# Patient Record
Sex: MALE | Race: WHITE | NOT HISPANIC OR LATINO | URBAN - METROPOLITAN AREA
[De-identification: names, ages, dates, MRNs, and addresses within clinical notes are randomized per-mention and may not be internally consistent; named-entity substitution may affect disease eponyms.]

---

## 2018-05-19 ENCOUNTER — INPATIENT (INPATIENT)
Facility: HOSPITAL | Age: 33
LOS: 0 days | Discharge: HOME | DRG: 343 | End: 2018-05-20
Attending: SURGERY | Admitting: SURGERY
Payer: COMMERCIAL

## 2018-05-19 VITALS
RESPIRATION RATE: 20 BRPM | DIASTOLIC BLOOD PRESSURE: 79 MMHG | OXYGEN SATURATION: 100 % | HEART RATE: 61 BPM | TEMPERATURE: 98 F | WEIGHT: 149.91 LBS | SYSTOLIC BLOOD PRESSURE: 111 MMHG | HEIGHT: 67 IN

## 2018-05-19 LAB
ALBUMIN SERPL ELPH-MCNC: 4.8 G/DL — SIGNIFICANT CHANGE UP (ref 3.3–5)
ALP SERPL-CCNC: 37 U/L — LOW (ref 40–120)
ALT FLD-CCNC: 22 U/L — SIGNIFICANT CHANGE UP (ref 10–45)
ANION GAP SERPL CALC-SCNC: 17 MMOL/L — SIGNIFICANT CHANGE UP (ref 5–17)
APPEARANCE UR: CLEAR — SIGNIFICANT CHANGE UP
AST SERPL-CCNC: 29 U/L — SIGNIFICANT CHANGE UP (ref 10–40)
BASOPHILS NFR BLD AUTO: 0.1 % — SIGNIFICANT CHANGE UP (ref 0–2)
BILIRUB SERPL-MCNC: 0.8 MG/DL — SIGNIFICANT CHANGE UP (ref 0.2–1.2)
BILIRUB UR-MCNC: (no result)
BLD GP AB SCN SERPL QL: NEGATIVE — SIGNIFICANT CHANGE UP
BLD GP AB SCN SERPL QL: NEGATIVE — SIGNIFICANT CHANGE UP
BUN SERPL-MCNC: 14 MG/DL — SIGNIFICANT CHANGE UP (ref 7–23)
CALCIUM SERPL-MCNC: 9.6 MG/DL — SIGNIFICANT CHANGE UP (ref 8.4–10.5)
CHLORIDE SERPL-SCNC: 96 MMOL/L — SIGNIFICANT CHANGE UP (ref 96–108)
CO2 SERPL-SCNC: 25 MMOL/L — SIGNIFICANT CHANGE UP (ref 22–31)
COLOR SPEC: YELLOW — SIGNIFICANT CHANGE UP
CREAT SERPL-MCNC: 0.96 MG/DL — SIGNIFICANT CHANGE UP (ref 0.5–1.3)
DIFF PNL FLD: NEGATIVE — SIGNIFICANT CHANGE UP
EXTRA BLUE TOP TUBE: SIGNIFICANT CHANGE UP
GLUCOSE SERPL-MCNC: 118 MG/DL — HIGH (ref 70–99)
GLUCOSE UR QL: NEGATIVE — SIGNIFICANT CHANGE UP
HCT VFR BLD CALC: 43 % — SIGNIFICANT CHANGE UP (ref 39–50)
HGB BLD-MCNC: 15.3 G/DL — SIGNIFICANT CHANGE UP (ref 13–17)
KETONES UR-MCNC: >=80 MG/DL
LEUKOCYTE ESTERASE UR-ACNC: NEGATIVE — SIGNIFICANT CHANGE UP
LIDOCAIN IGE QN: 23 U/L — SIGNIFICANT CHANGE UP (ref 7–60)
LYMPHOCYTES # BLD AUTO: 4.5 % — LOW (ref 13–44)
MCHC RBC-ENTMCNC: 31.8 PG — SIGNIFICANT CHANGE UP (ref 27–34)
MCHC RBC-ENTMCNC: 35.6 G/DL — SIGNIFICANT CHANGE UP (ref 32–36)
MCV RBC AUTO: 89.4 FL — SIGNIFICANT CHANGE UP (ref 80–100)
MONOCYTES NFR BLD AUTO: 12.1 % — SIGNIFICANT CHANGE UP (ref 2–14)
NEUTROPHILS NFR BLD AUTO: 83.3 % — HIGH (ref 43–77)
NITRITE UR-MCNC: NEGATIVE — SIGNIFICANT CHANGE UP
PH UR: 6.5 — SIGNIFICANT CHANGE UP (ref 5–8)
PLATELET # BLD AUTO: 173 K/UL — SIGNIFICANT CHANGE UP (ref 150–400)
POTASSIUM SERPL-MCNC: 4.2 MMOL/L — SIGNIFICANT CHANGE UP (ref 3.5–5.3)
POTASSIUM SERPL-SCNC: 4.2 MMOL/L — SIGNIFICANT CHANGE UP (ref 3.5–5.3)
PROT SERPL-MCNC: 8.1 G/DL — SIGNIFICANT CHANGE UP (ref 6–8.3)
PROT UR-MCNC: (no result) MG/DL
RBC # BLD: 4.81 M/UL — SIGNIFICANT CHANGE UP (ref 4.2–5.8)
RBC # FLD: 12.3 % — SIGNIFICANT CHANGE UP (ref 10.3–16.9)
RH IG SCN BLD-IMP: NEGATIVE — SIGNIFICANT CHANGE UP
RH IG SCN BLD-IMP: NEGATIVE — SIGNIFICANT CHANGE UP
SODIUM SERPL-SCNC: 138 MMOL/L — SIGNIFICANT CHANGE UP (ref 135–145)
SP GR SPEC: 1.02 — SIGNIFICANT CHANGE UP (ref 1–1.03)
UROBILINOGEN FLD QL: 0.2 E.U./DL — SIGNIFICANT CHANGE UP
WBC # BLD: 16.2 K/UL — HIGH (ref 3.8–10.5)
WBC # FLD AUTO: 16.2 K/UL — HIGH (ref 3.8–10.5)

## 2018-05-19 PROCEDURE — 99285 EMERGENCY DEPT VISIT HI MDM: CPT | Mod: 25

## 2018-05-19 PROCEDURE — 93010 ELECTROCARDIOGRAM REPORT: CPT

## 2018-05-19 PROCEDURE — 74177 CT ABD & PELVIS W/CONTRAST: CPT | Mod: 26

## 2018-05-19 RX ORDER — SODIUM CHLORIDE 9 MG/ML
1000 INJECTION, SOLUTION INTRAVENOUS
Qty: 0 | Refills: 0 | Status: DISCONTINUED | OUTPATIENT
Start: 2018-05-19 | End: 2018-05-19

## 2018-05-19 RX ORDER — HEPARIN SODIUM 5000 [USP'U]/ML
5000 INJECTION INTRAVENOUS; SUBCUTANEOUS EVERY 8 HOURS
Qty: 0 | Refills: 0 | Status: DISCONTINUED | OUTPATIENT
Start: 2018-05-19 | End: 2018-05-19

## 2018-05-19 RX ORDER — CEFOXITIN 1 G/1
2 INJECTION, POWDER, FOR SOLUTION INTRAVENOUS ONCE
Qty: 0 | Refills: 0 | Status: COMPLETED | OUTPATIENT
Start: 2018-05-19 | End: 2018-05-19

## 2018-05-19 RX ORDER — SODIUM CHLORIDE 9 MG/ML
1000 INJECTION INTRAMUSCULAR; INTRAVENOUS; SUBCUTANEOUS ONCE
Qty: 0 | Refills: 0 | Status: COMPLETED | OUTPATIENT
Start: 2018-05-19 | End: 2018-05-19

## 2018-05-19 RX ORDER — IOHEXOL 300 MG/ML
50 INJECTION, SOLUTION INTRAVENOUS ONCE
Qty: 0 | Refills: 0 | Status: COMPLETED | OUTPATIENT
Start: 2018-05-19 | End: 2018-05-19

## 2018-05-19 RX ORDER — FAMOTIDINE 10 MG/ML
20 INJECTION INTRAVENOUS ONCE
Qty: 0 | Refills: 0 | Status: COMPLETED | OUTPATIENT
Start: 2018-05-19 | End: 2018-05-19

## 2018-05-19 RX ORDER — SODIUM CHLORIDE 9 MG/ML
3 INJECTION INTRAMUSCULAR; INTRAVENOUS; SUBCUTANEOUS ONCE
Qty: 0 | Refills: 0 | Status: COMPLETED | OUTPATIENT
Start: 2018-05-19 | End: 2018-05-19

## 2018-05-19 RX ORDER — HYDROMORPHONE HYDROCHLORIDE 2 MG/ML
0.5 INJECTION INTRAMUSCULAR; INTRAVENOUS; SUBCUTANEOUS EVERY 6 HOURS
Qty: 0 | Refills: 0 | Status: DISCONTINUED | OUTPATIENT
Start: 2018-05-19 | End: 2018-05-19

## 2018-05-19 RX ORDER — ONDANSETRON 8 MG/1
4 TABLET, FILM COATED ORAL EVERY 6 HOURS
Qty: 0 | Refills: 0 | Status: DISCONTINUED | OUTPATIENT
Start: 2018-05-19 | End: 2018-05-19

## 2018-05-19 RX ORDER — CEFOTETAN DISODIUM 1 G
2 VIAL (EA) INJECTION EVERY 12 HOURS
Qty: 0 | Refills: 0 | Status: DISCONTINUED | OUTPATIENT
Start: 2018-05-19 | End: 2018-05-19

## 2018-05-19 RX ORDER — ONDANSETRON 8 MG/1
4 TABLET, FILM COATED ORAL ONCE
Qty: 0 | Refills: 0 | Status: COMPLETED | OUTPATIENT
Start: 2018-05-19 | End: 2018-05-19

## 2018-05-19 RX ADMIN — SODIUM CHLORIDE 3 MILLILITER(S): 9 INJECTION INTRAMUSCULAR; INTRAVENOUS; SUBCUTANEOUS at 12:53

## 2018-05-19 RX ADMIN — HYDROMORPHONE HYDROCHLORIDE 0.5 MILLIGRAM(S): 2 INJECTION INTRAMUSCULAR; INTRAVENOUS; SUBCUTANEOUS at 22:02

## 2018-05-19 RX ADMIN — FAMOTIDINE 20 MILLIGRAM(S): 10 INJECTION INTRAVENOUS at 12:53

## 2018-05-19 RX ADMIN — SODIUM CHLORIDE 1000 MILLILITER(S): 9 INJECTION INTRAMUSCULAR; INTRAVENOUS; SUBCUTANEOUS at 12:53

## 2018-05-19 RX ADMIN — Medication 100 GRAM(S): at 21:03

## 2018-05-19 RX ADMIN — IOHEXOL 50 MILLILITER(S): 300 INJECTION, SOLUTION INTRAVENOUS at 13:20

## 2018-05-19 RX ADMIN — ONDANSETRON 4 MILLIGRAM(S): 8 TABLET, FILM COATED ORAL at 12:53

## 2018-05-19 RX ADMIN — SODIUM CHLORIDE 110 MILLILITER(S): 9 INJECTION, SOLUTION INTRAVENOUS at 19:11

## 2018-05-19 RX ADMIN — CEFOXITIN 100 GRAM(S): 1 INJECTION, POWDER, FOR SOLUTION INTRAVENOUS at 18:33

## 2018-05-19 RX ADMIN — HYDROMORPHONE HYDROCHLORIDE 0.5 MILLIGRAM(S): 2 INJECTION INTRAMUSCULAR; INTRAVENOUS; SUBCUTANEOUS at 21:00

## 2018-05-19 NOTE — H&P ADULT - NSHPPHYSICALEXAM_GEN_ALL_CORE
NEURO: A&Ox3, NAD, BARKER.  CV: RRR, S1&S2.   PULM: CTAB, no increased WOB.  ABD: Soft, ND, TTP in RLQ>epigastrium, no rebound or guarding, no Rovsing sign.   EXTR: WWP. No peripheral edema.

## 2018-05-19 NOTE — ED PROVIDER NOTE - GASTROINTESTINAL, MLM
Abd soft,  ND, NABS. + mild - mod ttp in RLQ and suprapubic region. No guarding, rebound, or rigidity. No pulsatile abdominal masses. No organomegaly appreciated. no CVAT

## 2018-05-19 NOTE — ED PROVIDER NOTE - OBJECTIVE STATEMENT
Pt w/ no sig PMHx, no PSHx sent in by [facility] for evaluation of abd pain. Pt report abdominal pain x 12 hours. The pain is located in the epigastric region and is non radiating. The pain has been constant. Pt is unable to describe the pain. The pain has been associated w/ multiple episodes of watery, non bloody, non mucousy diarrhea. + nausea. Pt admits to self-induced vomiting. No f/c. Last meal popcorn approx 6 hours prior to onset of sx. Prior to that, pt had street hotdog, approx 12 hours prior to onset of sx. Pt went to [facility] and was given Anna Marie-mox (Alum Hydroxide w/ Simethicone) and Ibuprofen w/ marked improvement of pain. Pt reports pain was 10/10, now 3/10. Pt also reports diarrhea has stopped w/ no episodes in a few hours.

## 2018-05-19 NOTE — ED ADULT TRIAGE NOTE - CHIEF COMPLAINT QUOTE
Patient c/o RLQ abdominal pain , nausea , vomiting and diarrhea for 7 hrs . Was sent from urgent care for r/o appendicitis .

## 2018-05-19 NOTE — ED ADULT NURSE NOTE - OBJECTIVE STATEMENT
[facility], c/o mid-abdominal pain and many episodes of diarrhea since 1am, emesis x1, pain partially relieved after receiving ibuprofen and antacid from [facility]. Pt with +RLQ tenderness on palpation. Denies fever. Denies urinary sx. VSS.

## 2018-05-19 NOTE — ED PROVIDER NOTE - CONSTITUTIONAL, MLM
normal... Well appearing, well nourished, awake, alert, oriented to person, place, time/situation and in no apparent distress. non toxic appearing

## 2018-05-19 NOTE — H&P ADULT - HISTORY OF PRESENT ILLNESS
32y M generally healthy who presented to [facility] ED 5/19/18 [facility] w/ acute onset abdominal pain since 1am (initially in epigastrium, migrating to RLQ) assoc w/ profuse diarrhea (non-bloody), nausea & single ep of self-induced vomiting. Denies any fevers or chills. No sick contacts. In the ED, pt remains afebrile & hemodynamically stable w/ leukocytosis (WBC 16.2) & left-shift (83% PMNs). U/A -LE/-nitrites. CT A/P showed dilated appendix w/ appendicolith at tip & mild surrounding fat standing.

## 2018-05-19 NOTE — H&P ADULT - ASSESSMENT
32y M w/ acute appendicitis    -Admit to [facility] - [facility] - Dr. Nakul Lepe   -Pain/nausea control PRN  -NPO, LR@110  -Cefotetan   -SQH, SCDs, OOB/A, IS  -Pre-op for OR   -Add to OR schedule for laparoscopic appendectomy, possible open

## 2018-05-19 NOTE — ED PROVIDER NOTE - MEDICAL DECISION MAKING DETAILS
Pt p/w abd pain, diarrhea, nausea. + RLQ / suprapubic ttp. DDx includes colitis, appendicitis, less likely other pathology. Check labs, CT a/p, IVF, sx relief. Dispo pending w/u and clinical status

## 2018-05-19 NOTE — ED PROVIDER NOTE - PROGRESS NOTE DETAILS
Pt seen by surgery. For admission to [facility]. For OR tonight for acute appendicitis. Cefoxitin ordered. Keep NPO Pt seen by surgery. + appendicitis on CT. For admission to [facility]. For OR tonight for acute appendicitis. Cefoxitin ordered. Keep NPO CT w/ non-vis of appendix. ? cystitis on CT: no urinary sx, UA neg. On re-eval, pt w/ continued pain, although states much less than before. Repeat exam: moderate suprapubic and RLQ ttp w/ + Rosvig's. Exam concerning for appendicitis. D/w radiologist Dr Camargo: contrast has not reached distal small bowel, can repeat CT. D/w pt, agrees to stay for surgical evaluation and repeat CT. Will consult surgery.

## 2018-05-20 VITALS
SYSTOLIC BLOOD PRESSURE: 107 MMHG | OXYGEN SATURATION: 97 % | RESPIRATION RATE: 17 BRPM | TEMPERATURE: 98 F | HEART RATE: 61 BPM | DIASTOLIC BLOOD PRESSURE: 67 MMHG

## 2018-05-20 LAB
CULTURE RESULTS: NO GROWTH — SIGNIFICANT CHANGE UP
SPECIMEN SOURCE: SIGNIFICANT CHANGE UP

## 2018-05-20 RX ORDER — OXYCODONE AND ACETAMINOPHEN 5; 325 MG/1; MG/1
1 TABLET ORAL EVERY 4 HOURS
Qty: 0 | Refills: 0 | Status: DISCONTINUED | OUTPATIENT
Start: 2018-05-20 | End: 2018-05-20

## 2018-05-20 RX ORDER — HYDROMORPHONE HYDROCHLORIDE 2 MG/ML
0.5 INJECTION INTRAMUSCULAR; INTRAVENOUS; SUBCUTANEOUS
Qty: 0 | Refills: 0 | Status: DISCONTINUED | OUTPATIENT
Start: 2018-05-20 | End: 2018-05-20

## 2018-05-20 RX ORDER — ONDANSETRON 8 MG/1
4 TABLET, FILM COATED ORAL EVERY 8 HOURS
Qty: 0 | Refills: 0 | Status: DISCONTINUED | OUTPATIENT
Start: 2018-05-20 | End: 2018-05-20

## 2018-05-20 RX ORDER — OXYCODONE AND ACETAMINOPHEN 5; 325 MG/1; MG/1
2 TABLET ORAL EVERY 4 HOURS
Qty: 0 | Refills: 0 | Status: DISCONTINUED | OUTPATIENT
Start: 2018-05-20 | End: 2018-05-20

## 2018-05-20 RX ORDER — CEFOTETAN DISODIUM 1 G
2 VIAL (EA) INJECTION EVERY 12 HOURS
Qty: 0 | Refills: 0 | Status: COMPLETED | OUTPATIENT
Start: 2018-05-20 | End: 2018-05-20

## 2018-05-20 RX ORDER — HEPARIN SODIUM 5000 [USP'U]/ML
5000 INJECTION INTRAVENOUS; SUBCUTANEOUS EVERY 8 HOURS
Qty: 0 | Refills: 0 | Status: DISCONTINUED | OUTPATIENT
Start: 2018-05-20 | End: 2018-05-20

## 2018-05-20 RX ORDER — SODIUM CHLORIDE 9 MG/ML
1000 INJECTION, SOLUTION INTRAVENOUS
Qty: 0 | Refills: 0 | Status: DISCONTINUED | OUTPATIENT
Start: 2018-05-20 | End: 2018-05-20

## 2018-05-20 RX ADMIN — Medication 100 GRAM(S): at 08:55

## 2018-05-20 RX ADMIN — OXYCODONE AND ACETAMINOPHEN 2 TABLET(S): 5; 325 TABLET ORAL at 09:44

## 2018-05-20 RX ADMIN — HEPARIN SODIUM 5000 UNIT(S): 5000 INJECTION INTRAVENOUS; SUBCUTANEOUS at 05:09

## 2018-05-20 RX ADMIN — OXYCODONE AND ACETAMINOPHEN 2 TABLET(S): 5; 325 TABLET ORAL at 09:54

## 2018-05-20 NOTE — DISCHARGE NOTE ADULT - CARE PROVIDER_API CALL
Nakul Lepe), ColonRectal Surgery; Surgery  [address]  [address]  [place] [address]  Phone: [phone]  Fax: [phone]

## 2018-05-20 NOTE — PROGRESS NOTE ADULT - SUBJECTIVE AND OBJECTIVE BOX
5/19: admitted with acute appendicitis, s/p lap appy : admitted with acute appendicitis, s/p lap appy      SUBJECTIVE: denies any complaints   Flatus: [x ] YES [ ] NO             Bowel Movement: [x ] YES [ ] NO  Pain (0-10):            Pain Control Adequate: [x ] YES [ ] NO  Nausea: [ ] YES [x ] NO            Vomiting: [ ] YES [ x] NO  Diarrhea: [ ] YES [x ] NO         Constipation: [ ] YES [ x] NO     Chest Pain: [ ] YES [ x] NO    SOB:  [ ] YES [x] NO    MEDICATIONS  (STANDING):  cefoTEtan  IVPB 2 Gram(s) IV Intermittent every 12 hours  heparin  Injectable 5000 Unit(s) SubCutaneous every 8 hours  lactated ringers. 1000 milliLiter(s) (110 mL/Hr) IV Continuous <Continuous>    MEDICATIONS  (PRN):  HYDROmorphone  Injectable 0.5 milliGRAM(s) IV Push every 2 hours PRN breakthrough pain  ondansetron Injectable 4 milliGRAM(s) IV Push every 8 hours PRN Nausea and/or Vomiting  oxyCODONE    5 mG/acetaminophen 325 mG 2 Tablet(s) Oral every 4 hours PRN Severe Pain (7 - 10)  oxyCODONE    5 mG/acetaminophen 325 mG 1 Tablet(s) Oral every 4 hours PRN Moderate Pain (4 - 6)      Vital Signs Last 24 Hrs  T(C): 36.6 (20 May 2018 07:56), Max: 37.2 (19 May 2018 14:55)  T(F): 97.8 (20 May 2018 07:56), Max: 99 (20 May 2018 00:10)  HR: 61 (20 May 2018 07:56) (54 - 94)  BP: 107/67 (20 May 2018 07:56) (107/60 - 129/79)  BP(mean): 79 (20 May 2018 00:55) (76 - 90)  RR: 17 (20 May 2018 07:56) (15 - 20)  SpO2: 97% (20 May 2018 07:56) (96% - 100%)    PHYSICAL EXAM:      Constitutional: A&Ox3    Respiratory: non labored breathing, no respiratory distress    Cardiovascular: NSR, RRR    Gastrointestinal: Soft ND,NT                 Incision: CDI    Genitourinary: voiding    Extremities: (-) edema                  I&O's Detail    19 May 2018 07:01  -  20 May 2018 07:00  --------------------------------------------------------  IN:    lactated ringers.: 880 mL  Total IN: 880 mL    OUT:    Voided: 400 mL  Total OUT: 400 mL    Total NET: 480 mL      20 May 2018 07:01  -  20 May 2018 08:20  --------------------------------------------------------  IN:    lactated ringers.: 220 mL  Total IN: 220 mL    OUT:  Total OUT: 0 mL    Total NET: 220 mL          LABS:                        15.3   16.2  )-----------( 173      ( 19 May 2018 12:57 )             43.0     05-19    138  |  96  |  14  ----------------------------<  118<H>  4.2   |  25  |  0.96    Ca    9.6      19 May 2018 12:57    TPro  8.1  /  Alb  4.8  /  TBili  0.8  /  DBili  x   /  AST  29  /  ALT  22  /  AlkPhos  37<L>  05-19      Urinalysis Basic - ( 19 May 2018 15:40 )    Color: Yellow / Appearance: Clear / S.020 / pH: x  Gluc: x / Ketone: >=80 mg/dL  / Bili: Small / Urobili: 0.2 E.U./dL   Blood: x / Protein: Trace mg/dL / Nitrite: NEGATIVE   Leuk Esterase: NEGATIVE / RBC: < 5 /HPF / WBC < 5 /HPF   Sq Epi: x / Non Sq Epi: 0-5 /HPF / Bacteria: Present /HPF        RADIOLOGY & ADDITIONAL STUDIES:

## 2018-05-20 NOTE — DISCHARGE NOTE ADULT - MEDICATION SUMMARY - MEDICATIONS TO TAKE
I will START or STAY ON the medications listed below when I get home from the hospital:    oxyCODONE-acetaminophen 5 mg-325 mg oral tablet  -- 1 tab(s) by mouth every 6 hours, As Needed -for severe pain MDD:4   -- Caution federal law prohibits the transfer of this drug to any person other  than the person for whom it was prescribed.  May cause drowsiness.  Alcohol may intensify this effect.  Use care when operating dangerous machinery.  This prescription cannot be refilled.  This product contains acetaminophen.  Do not use  with any other product containing acetaminophen to prevent possible liver damage.  Using more of this medication than prescribed may cause serious breathing problems.    -- Indication: For severe pain

## 2018-05-20 NOTE — DISCHARGE NOTE ADULT - PATIENT PORTAL LINK FT
You can access the [facility] Patient Portal, offered by [facility], by registering with the following website: [email or web address] [facility], [facility]

## 2018-05-20 NOTE — DISCHARGE NOTE ADULT - PLAN OF CARE
recovery Follow up with Dr. Lepe in 1-2 weeks. Call the office at the number below to schedule your appointment. You may shower; soap and water over incision sites. Do not scrub. Pat dry when done. No tub bathing or swimming until cleared. Keep incision sites out of the sun as scars will darken. Ambulate as tolerated, but no heavy lifting (>10lbs) or strenuous exercise. You may resume regular diet. You should be urinating at least 3-4x per day. Call the office if you experience increasing abdominal pain, nausea, vomiting, or temperature >101 F.

## 2018-05-20 NOTE — PROGRESS NOTE ADULT - ASSESSMENT
32y M w/ acute appendicitis    -Pain/nausea control PRN  -CLD, LR@110  -Cefotetan   -SQH, SCDs, OOB/A, IS 32y M w/ acute appendicitis    -Pain/nausea control PRN  -REG DIET  -Cefotetan   -SQH, SCDs, OOB/A, IS

## 2018-05-20 NOTE — PROGRESS NOTE ADULT - SUBJECTIVE AND OBJECTIVE BOX
Procedure: Appendectomy, laparoscopic    Surgeon:     Subjective: Patient examined postoperatively.  Pain adequately controlled. Denies any complaints. at this time.       Vital Signs Last 24 Hrs  T(C): 37 (20 May 2018 01:12), Max: 37.2 (19 May 2018 14:55)  T(F): 98.6 (20 May 2018 01:12), Max: 99 (20 May 2018 00:10)  HR: 67 (20 May 2018 01:12) (61 - 94)  BP: 129/79 (20 May 2018 01:12) (107/60 - 129/79)  BP(mean): 79 (20 May 2018 00:55) (76 - 90)  RR: 18 (20 May 2018 01:12) (15 - 20)  SpO2: 98% (20 May 2018 01:12) (96% - 100%)    Physical Exam:  General: NAD, resting comfortably in bed  Pulmonary: Nonlabored breathing, no respiratory distress  Abdominal: soft, ND, stephanie ttp, incisions cdi      LABS:                        15.3   16.2  )-----------( 173      ( 19 May 2018 12:57 )             43.0     05-19    138  |  96  |  14  ----------------------------<  118<H>  4.2   |  25  |  0.96    Ca    9.6      19 May 2018 12:57    TPro  8.1  /  Alb  4.8  /  TBili  0.8  /  DBili  x   /  AST  29  /  ALT  22  /  AlkPhos  37<L>  05-19      CAPILLARY BLOOD GLUCOSE        Urinalysis Basic - ( 19 May 2018 15:40 )    Color: Yellow / Appearance: Clear / S.020 / pH: x  Gluc: x / Ketone: >=80 mg/dL  / Bili: Small / Urobili: 0.2 E.U./dL   Blood: x / Protein: Trace mg/dL / Nitrite: NEGATIVE   Leuk Esterase: NEGATIVE / RBC: < 5 /HPF / WBC < 5 /HPF   Sq Epi: x / Non Sq Epi: 0-5 /HPF / Bacteria: Present /HPF      LIVER FUNCTIONS - ( 19 May 2018 12:57 )  Alb: 4.8 g/dL / Pro: 8.1 g/dL / ALK PHOS: 37 U/L / ALT: 22 U/L / AST: 29 U/L / GGT: x           ABO Interpretation: A ( @ 18:02)        Radiology and Additional Studies:    Assessment:32y Male s/p lap appy    Plan:  Pain/nausea control PRN  Home meds  Incentive spirometer/OOB/Ambulate  IVF, Diet: CLD  AM labs

## 2018-05-20 NOTE — DISCHARGE NOTE ADULT - CARE PLAN
Principal Discharge DX:	Acute appendicitis, unspecified acute appendicitis type  Goal:	recovery  Assessment and plan of treatment:	Follow up with Dr. Lepe in 1-2 weeks. Call the office at the number below to schedule your appointment. You may shower; soap and water over incision sites. Do not scrub. Pat dry when done. No tub bathing or swimming until cleared. Keep incision sites out of the sun as scars will darken. Ambulate as tolerated, but no heavy lifting (>10lbs) or strenuous exercise. You may resume regular diet. You should be urinating at least 3-4x per day. Call the office if you experience increasing abdominal pain, nausea, vomiting, or temperature >101 F.

## 2018-05-20 NOTE — BRIEF OPERATIVE NOTE - PROCEDURE
<<-----Click on this checkbox to enter Procedure Appendectomy, laparoscopic  05/20/2018    Active  [email or web address]

## 2018-05-20 NOTE — DISCHARGE NOTE ADULT - HOSPITAL COURSE
32y M generally healthy who presented to [facility] ED [facility]19/18 [facility] w/ acute onset abdominal pain since 1am (initially in epigastrium, migrating to RLQ) assoc w/ profuse diarrhea (non-bloody), nausea & single ep of self-induced vomiting. Denies any fevers or chills. No sick contacts. In the ED, pt remains afebrile & hemodynamically stable w/ leukocytosis (WBC 16.2) & left-shift (83% PMNs). U/A -LE/-nitrites. CT A/P showed dilated appendix w/ appendicolith at tip & mild surrounding fat standing.   Pt underwent lap appy, procedure was uncomplicated. Post op course was uneventful. Pt tolerate PO intake and voided adequately. At time of discharge pt was stable.

## 2018-05-22 PROCEDURE — 93005 ELECTROCARDIOGRAM TRACING: CPT

## 2018-05-22 PROCEDURE — 83690 ASSAY OF LIPASE: CPT

## 2018-05-22 PROCEDURE — 96374 THER/PROPH/DIAG INJ IV PUSH: CPT | Mod: XU

## 2018-05-22 PROCEDURE — 99285 EMERGENCY DEPT VISIT HI MDM: CPT | Mod: 25

## 2018-05-22 PROCEDURE — 86900 BLOOD TYPING SEROLOGIC ABO: CPT

## 2018-05-22 PROCEDURE — 36415 COLL VENOUS BLD VENIPUNCTURE: CPT

## 2018-05-22 PROCEDURE — 81001 URINALYSIS AUTO W/SCOPE: CPT

## 2018-05-22 PROCEDURE — 86901 BLOOD TYPING SEROLOGIC RH(D): CPT

## 2018-05-22 PROCEDURE — 87086 URINE CULTURE/COLONY COUNT: CPT

## 2018-05-22 PROCEDURE — 86850 RBC ANTIBODY SCREEN: CPT

## 2018-05-22 PROCEDURE — 74177 CT ABD & PELVIS W/CONTRAST: CPT

## 2018-05-22 PROCEDURE — 85025 COMPLETE CBC W/AUTO DIFF WBC: CPT

## 2018-05-22 PROCEDURE — 88304 TISSUE EXAM BY PATHOLOGIST: CPT

## 2018-05-22 PROCEDURE — 80053 COMPREHEN METABOLIC PANEL: CPT

## 2018-05-22 PROCEDURE — 96375 TX/PRO/DX INJ NEW DRUG ADDON: CPT

## 2018-05-30 LAB — SURGICAL PATHOLOGY STUDY: SIGNIFICANT CHANGE UP

## 2018-05-31 DIAGNOSIS — D72.829 ELEVATED WHITE BLOOD CELL COUNT, UNSPECIFIED: ICD-10-CM

## 2018-05-31 DIAGNOSIS — K35.80 UNSPECIFIED ACUTE APPENDICITIS: ICD-10-CM

## 2018-05-31 DIAGNOSIS — Z28.89 IMMUNIZATION NOT CARRIED OUT FOR OTHER REASON: ICD-10-CM

## 2019-01-03 NOTE — ED ADULT NURSE NOTE - NSSISCREENINGQ1_ED_A_ED
Post-Care Instructions: I reviewed with the patient in detail post-care instructions. Patient is to wear sunprotection, and avoid picking at any of the treated lesions. Pt may apply Vaseline to crusted or scabbing areas.
Render Post-Care Instructions In Note?: no
Duration Of Freeze Thaw-Cycle (Seconds): 0
Consent: The patient's consent was obtained including but not limited to risks of crusting, scabbing, blistering, scarring, darker or lighter pigmentary change, recurrence, incomplete removal and infection.
Medical Necessity Clause: This procedure was medically necessary because the lesions that were treated were:
Medical Necessity Information: It is in your best interest to select a reason for this procedure from the list below. All of these items fulfill various CMS LCD requirements except the new and changing color options.
Detail Level: Detailed
No
